# Patient Record
Sex: MALE | Race: WHITE | NOT HISPANIC OR LATINO | URBAN - METROPOLITAN AREA
[De-identification: names, ages, dates, MRNs, and addresses within clinical notes are randomized per-mention and may not be internally consistent; named-entity substitution may affect disease eponyms.]

---

## 2020-07-16 ENCOUNTER — TELEPHONE ENCOUNTER (OUTPATIENT)
Dept: URBAN - METROPOLITAN AREA CLINIC 92 | Facility: CLINIC | Age: 55
End: 2020-07-16

## 2020-07-16 ENCOUNTER — OFFICE VISIT (OUTPATIENT)
Dept: URBAN - METROPOLITAN AREA SURGERY CENTER 23 | Facility: SURGERY CENTER | Age: 55
End: 2020-07-16
Payer: COMMERCIAL

## 2020-07-16 DIAGNOSIS — K22.70 BARRETT ESOPHAGUS: ICD-10-CM

## 2020-07-16 DIAGNOSIS — K29.60 ADENOPAPILLOMATOSIS GASTRICA: ICD-10-CM

## 2020-07-16 DIAGNOSIS — K22.2 ACQUIRED ESOPHAGEAL RING: ICD-10-CM

## 2020-07-16 PROCEDURE — 43239 EGD BIOPSY SINGLE/MULTIPLE: CPT | Performed by: INTERNAL MEDICINE

## 2020-07-16 PROCEDURE — G8907 PT DOC NO EVENTS ON DISCHARG: HCPCS | Performed by: INTERNAL MEDICINE

## 2020-07-16 RX ORDER — PANTOPRAZOLE SODIUM 40 MG/1
1 TABLET TABLET, DELAYED RELEASE ORAL BID
Qty: 60 | Refills: 5 | OUTPATIENT
Start: 2020-07-16

## 2020-07-16 RX ORDER — ASPIRIN 81 MG/1
TABLET, COATED ORAL
Qty: 0 | Refills: 0 | Status: ACTIVE | COMMUNITY
Start: 1900-01-01 | End: 1900-01-01

## 2020-07-16 RX ORDER — OXYCODONE 30 MG/1
TAKE 1 TABLET (30 MG) BY ORAL ROUTE EVERY 6 HOURS TABLET ORAL
Qty: 0 | Refills: 0 | Status: ACTIVE | COMMUNITY
Start: 1900-01-01 | End: 1900-01-01

## 2020-07-30 ENCOUNTER — HOSPITAL ENCOUNTER (EMERGENCY)
Dept: HOSPITAL 5 - ED | Age: 55
Discharge: LEFT BEFORE DISCHARGE | End: 2020-07-30
Payer: MEDICAID

## 2020-07-30 VITALS — SYSTOLIC BLOOD PRESSURE: 131 MMHG | DIASTOLIC BLOOD PRESSURE: 76 MMHG

## 2020-07-30 DIAGNOSIS — M54.6: ICD-10-CM

## 2020-07-30 DIAGNOSIS — Z53.21: ICD-10-CM

## 2020-07-30 DIAGNOSIS — R07.89: Primary | ICD-10-CM

## 2020-07-30 PROCEDURE — 93005 ELECTROCARDIOGRAM TRACING: CPT

## 2022-02-15 ENCOUNTER — OFFICE VISIT (OUTPATIENT)
Dept: URBAN - METROPOLITAN AREA CLINIC 118 | Facility: CLINIC | Age: 57
End: 2022-02-15

## 2022-12-20 ENCOUNTER — OFFICE VISIT (OUTPATIENT)
Dept: URBAN - METROPOLITAN AREA CLINIC 118 | Facility: CLINIC | Age: 57
End: 2022-12-20
Payer: COMMERCIAL

## 2022-12-20 ENCOUNTER — LAB OUTSIDE AN ENCOUNTER (OUTPATIENT)
Dept: URBAN - METROPOLITAN AREA CLINIC 118 | Facility: CLINIC | Age: 57
End: 2022-12-20

## 2022-12-20 VITALS
WEIGHT: 196.6 LBS | HEIGHT: 73 IN | TEMPERATURE: 97 F | SYSTOLIC BLOOD PRESSURE: 149 MMHG | DIASTOLIC BLOOD PRESSURE: 93 MMHG | BODY MASS INDEX: 26.06 KG/M2 | HEART RATE: 101 BPM

## 2022-12-20 DIAGNOSIS — Z12.11 COLON CANCER SCREENING: ICD-10-CM

## 2022-12-20 DIAGNOSIS — K59.03 DRUG-INDUCED CONSTIPATION: ICD-10-CM

## 2022-12-20 DIAGNOSIS — K42.9 UMBILICAL HERNIA WITHOUT OBSTRUCTION AND WITHOUT GANGRENE: ICD-10-CM

## 2022-12-20 DIAGNOSIS — K22.70 BARRETT'S ESOPHAGUS WITHOUT DYSPLASIA: ICD-10-CM

## 2022-12-20 DIAGNOSIS — K21.00 GASTROESOPHAGEAL REFLUX DISEASE WITH ESOPHAGITIS WITHOUT HEMORRHAGE: ICD-10-CM

## 2022-12-20 DIAGNOSIS — Z87.19 HISTORY OF ACUTE PANCREATITIS: ICD-10-CM

## 2022-12-20 DIAGNOSIS — K86.81 EXOCRINE PANCREATIC INSUFFICIENCY: ICD-10-CM

## 2022-12-20 PROCEDURE — 99214 OFFICE O/P EST MOD 30 MIN: CPT | Performed by: INTERNAL MEDICINE

## 2022-12-20 RX ORDER — ASPIRIN 81 MG/1
TABLET, COATED ORAL
Qty: 0 | Refills: 0 | Status: ACTIVE | COMMUNITY
Start: 1900-01-01

## 2022-12-20 RX ORDER — PANTOPRAZOLE SODIUM 40 MG/1
1 TABLET TABLET, DELAYED RELEASE ORAL BID
Qty: 60 | Refills: 5 | Status: DISCONTINUED | COMMUNITY
Start: 2020-07-16

## 2022-12-20 RX ORDER — ESOMEPRAZOLE MAGNESIUM 40 MG/1
1 CAPSULE CAPSULE, DELAYED RELEASE ORAL ONCE A DAY
Status: ACTIVE | COMMUNITY

## 2022-12-20 RX ORDER — OXYCODONE 30 MG/1
TAKE 1 TABLET (30 MG) BY ORAL ROUTE EVERY 6 HOURS TABLET ORAL
Qty: 0 | Refills: 0 | Status: DISCONTINUED | COMMUNITY
Start: 1900-01-01

## 2022-12-20 NOTE — HPI-TODAY'S VISIT:
The patient was last seen in 2020 and is following up for several GI issues.  He has a history of acute alcoholic pancreatitis several years ago.  A CT scan in 2020 showed no evidence of chronic changes.  He has been on Creon therapy for several years and is trying to taper this off.  He has no steatorrhea, nor abnormal loss of weight.  He is down to 1-3 Creon tablets per meal.  He has significant constipation and takes MiraLAX daily he does use narcotics from a pain management center.  He has never tried prescription narcotics.  He is due for screening colonoscopy and has no family history of colon cancer or colon polyps.  His  most significant complaint today is uncontrolled reflux.  He is taking 2 Nexium per day and supplementing with Pepcid over-the-counter.  He has severe substernal burning but no significant dysphagia.  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  He has no dysphagia or odynophagia.  He continues to consume alcohol up to 1/5 of liquor every 2 weeks.  He does have a small umbilical hernia for the last 2 years, that is mildly enlarging in size, but is reducible and not very tender.  He has had no prior imaging or surgical consult.

## 2022-12-20 NOTE — PHYSICAL EXAM GASTROINTESTINAL
Abdomen , soft, nontender, nondistended , no guarding or rigidity , no masses palpable , normal bowel sounds , small umbilical hernia without obstruction , Liver and Spleen , no hepatomegaly present , no hepatosplenomegaly , liver nontender , spleen not palpable

## 2022-12-21 LAB — LIPASE: 20

## 2022-12-31 ENCOUNTER — WEB ENCOUNTER (OUTPATIENT)
Dept: URBAN - METROPOLITAN AREA CLINIC 118 | Facility: CLINIC | Age: 57
End: 2022-12-31

## 2023-01-10 ENCOUNTER — LAB OUTSIDE AN ENCOUNTER (OUTPATIENT)
Dept: URBAN - METROPOLITAN AREA CLINIC 118 | Facility: CLINIC | Age: 58
End: 2023-01-10

## 2023-01-15 LAB
COMMENT: (no result)
HCV RNA, QUANTITATIVE REAL TIME PCR: (no result)
HCV RNA, QUANTITATIVE REAL TIME PCR: 6.58
HEPATITIS B CORE AB TOTAL: (no result)
HEPATITIS B SURFACE ANTIGEN: (no result)
HEPATITIS C ANTIBODY: REACTIVE
INDEX: >11

## 2023-01-18 ENCOUNTER — WEB ENCOUNTER (OUTPATIENT)
Dept: URBAN - METROPOLITAN AREA CLINIC 118 | Facility: CLINIC | Age: 58
End: 2023-01-18

## 2023-01-19 ENCOUNTER — OFFICE VISIT (OUTPATIENT)
Dept: URBAN - METROPOLITAN AREA SURGERY CENTER 23 | Facility: SURGERY CENTER | Age: 58
End: 2023-01-19

## 2023-01-19 ENCOUNTER — OFFICE VISIT (OUTPATIENT)
Dept: URBAN - METROPOLITAN AREA CLINIC 118 | Facility: CLINIC | Age: 58
End: 2023-01-19
Payer: COMMERCIAL

## 2023-01-19 VITALS
SYSTOLIC BLOOD PRESSURE: 118 MMHG | HEART RATE: 112 BPM | BODY MASS INDEX: 24.57 KG/M2 | WEIGHT: 185.4 LBS | HEIGHT: 73 IN | TEMPERATURE: 97.7 F | DIASTOLIC BLOOD PRESSURE: 80 MMHG

## 2023-01-19 DIAGNOSIS — K22.70 BARRETT'S ESOPHAGUS WITHOUT DYSPLASIA: ICD-10-CM

## 2023-01-19 DIAGNOSIS — B18.2 CHRONIC HEPATITIS C WITHOUT HEPATIC COMA: ICD-10-CM

## 2023-01-19 DIAGNOSIS — Z12.11 COLON CANCER SCREENING: ICD-10-CM

## 2023-01-19 DIAGNOSIS — K21.00 GASTROESOPHAGEAL REFLUX DISEASE WITH ESOPHAGITIS WITHOUT HEMORRHAGE: ICD-10-CM

## 2023-01-19 PROCEDURE — 99214 OFFICE O/P EST MOD 30 MIN: CPT | Performed by: INTERNAL MEDICINE

## 2023-01-19 RX ORDER — ASPIRIN 81 MG/1
TABLET, COATED ORAL
Qty: 0 | Refills: 0 | Status: DISCONTINUED | COMMUNITY
Start: 1900-01-01

## 2023-01-19 RX ORDER — ESOMEPRAZOLE MAGNESIUM 40 MG/1
1 CAPSULE CAPSULE, DELAYED RELEASE ORAL ONCE A DAY
Status: ACTIVE | COMMUNITY

## 2023-01-19 RX ORDER — VELPATASVIR AND SOFOSBUVIR 100; 400 MG/1; MG/1
1 TABLET TABLET, FILM COATED ORAL ONCE A DAY
Qty: 84 TABLET | Refills: 0 | OUTPATIENT
Start: 2023-01-19 | End: 2023-04-13

## 2023-01-19 NOTE — HPI-TODAY'S VISIT:
The patient was recently diagnosed with hepatitis C, and a viral load confirms positivity.  He does have a distant history of intravenous drug use several years ago.  He also has had an exposure to hepatitis C through sex several years ago.  He has not used intravenous drugs in the last several years, and is no longer on alcohol.  He denies any right upper quadrant pain, nausea, or vomiting.  He has no history of jaundiced.  There is no family history of cirrhosis or end-stage liver disease.  He has not started any new medications recently.  He has had no prior treatment of the hepatitis.

## 2023-01-20 LAB
ABSOLUTE BASOPHILS: 66
ABSOLUTE EOSINOPHILS: 144
ABSOLUTE LYMPHOCYTES: 1755
ABSOLUTE MONOCYTES: 1074
ABSOLUTE NEUTROPHILS: (no result)
AFP, SERUM, TUMOR MARKER: 2.2
ALBUMIN/GLOBULIN RATIO: 1.4
ALBUMIN: 3.7
ALKALINE PHOSPHATASE: 72
ALT (SGPT): 42
AST (SGOT): 22
BASOPHILS: 0.5
BILIRUBIN, DIRECT: 0.2
BILIRUBIN, INDIRECT: 0.6
BILIRUBIN, TOTAL: 0.8
BUN/CREATININE RATIO: (no result)
CALCIUM: 9.2
CARBON DIOXIDE: 26
CHLORIDE: 99
CREATININE: 0.99
EGFR: 89
EOSINOPHILS: 1.1
GLOBULIN: 2.6
GLUCOSE: 72
HEMATOCRIT: 49.9
HEMOGLOBIN: 16.9
LYMPHOCYTES: 13.4
MCH: 31.2
MCHC: 33.9
MCV: 92.2
MONOCYTES: 8.2
MPV: 10
NEUTROPHILS: 76.8
PLATELET COUNT: 416
POTASSIUM: 4
PROTEIN, TOTAL: 6.3
RDW: 12.2
RED BLOOD CELL COUNT: 5.41
SODIUM: 137
UREA NITROGEN (BUN): 14
WHITE BLOOD CELL COUNT: 13.1

## 2023-01-22 PROBLEM — 266433003: Status: ACTIVE | Noted: 2022-12-20

## 2023-01-22 PROBLEM — 302914006: Status: ACTIVE | Noted: 2022-12-20

## 2023-03-07 ENCOUNTER — WEB ENCOUNTER (OUTPATIENT)
Dept: URBAN - METROPOLITAN AREA CLINIC 118 | Facility: CLINIC | Age: 58
End: 2023-03-07

## 2023-03-08 PROBLEM — 128302006: Status: ACTIVE | Noted: 2023-01-19

## 2023-03-15 LAB
ABSOLUTE BASOPHILS: 29
ABSOLUTE EOSINOPHILS: 252
ABSOLUTE LYMPHOCYTES: 2434
ABSOLUTE MONOCYTES: 511
ABSOLUTE NEUTROPHILS: 3974
ALBUMIN/GLOBULIN RATIO: 1.9
ALBUMIN: 4.4
ALKALINE PHOSPHATASE: 82
ALT (SGPT): 15
AST (SGOT): 18
BASOPHILS: 0.4
BILIRUBIN, DIRECT: 0.1
BILIRUBIN, INDIRECT: 0.4
BILIRUBIN, TOTAL: 0.5
BUN/CREATININE RATIO: (no result)
CALCIUM: 9.4
CARBON DIOXIDE: 28
CHLORIDE: 102
CREATININE: 1.17
EGFR: 73
EOSINOPHILS: 3.5
GLOBULIN: 2.3
GLUCOSE: 74
HCV RNA, QUANTITATIVE: <1.18
HCV RNA, QUANTITATIVE: <15
HEMATOCRIT: 44.3
HEMOGLOBIN: 15.4
LYMPHOCYTES: 33.8
MCH: 30.1
MCHC: 34.8
MCV: 86.5
MONOCYTES: 7.1
MPV: 10.6
NEUTROPHILS: 55.2
PLATELET COUNT: 274
POTASSIUM: 4.5
PROTEIN, TOTAL: 6.7
RDW: 12.8
RED BLOOD CELL COUNT: 5.12
SODIUM: 140
UREA NITROGEN (BUN): 24
WHITE BLOOD CELL COUNT: 7.2

## 2023-03-23 ENCOUNTER — OFFICE VISIT (OUTPATIENT)
Dept: URBAN - METROPOLITAN AREA CLINIC 118 | Facility: CLINIC | Age: 58
End: 2023-03-23
Payer: COMMERCIAL

## 2023-03-23 ENCOUNTER — LAB OUTSIDE AN ENCOUNTER (OUTPATIENT)
Dept: URBAN - METROPOLITAN AREA CLINIC 118 | Facility: CLINIC | Age: 58
End: 2023-03-23

## 2023-03-23 VITALS
DIASTOLIC BLOOD PRESSURE: 95 MMHG | BODY MASS INDEX: 24.78 KG/M2 | SYSTOLIC BLOOD PRESSURE: 159 MMHG | TEMPERATURE: 96.8 F | WEIGHT: 187 LBS | HEIGHT: 73 IN | HEART RATE: 65 BPM

## 2023-03-23 DIAGNOSIS — K22.70 BARRETT'S ESOPHAGUS WITHOUT DYSPLASIA: ICD-10-CM

## 2023-03-23 DIAGNOSIS — B18.2 CHRONIC HEPATITIS C WITHOUT HEPATIC COMA: ICD-10-CM

## 2023-03-23 DIAGNOSIS — K21.00 GASTROESOPHAGEAL REFLUX DISEASE WITH ESOPHAGITIS WITHOUT HEMORRHAGE: ICD-10-CM

## 2023-03-23 DIAGNOSIS — Z12.11 COLON CANCER SCREENING: ICD-10-CM

## 2023-03-23 PROCEDURE — 99214 OFFICE O/P EST MOD 30 MIN: CPT | Performed by: INTERNAL MEDICINE

## 2023-03-23 RX ORDER — ESOMEPRAZOLE MAGNESIUM 40 MG/1
1 CAPSULE CAPSULE, DELAYED RELEASE ORAL ONCE A DAY
Status: DISCONTINUED | COMMUNITY

## 2023-03-23 RX ORDER — ASPIRIN 81 MG/1
1 TABLET TABLET, COATED ORAL ONCE A DAY
Status: ACTIVE | COMMUNITY

## 2023-03-23 RX ORDER — VELPATASVIR AND SOFOSBUVIR 100; 400 MG/1; MG/1
1 TABLET TABLET, FILM COATED ORAL ONCE A DAY
Qty: 84 TABLET | Refills: 0 | Status: ACTIVE | COMMUNITY
Start: 2023-01-19 | End: 2023-04-13

## 2023-03-23 RX ORDER — DILTIAZEM HYDROCHLORIDE 240 MG/1
1 CAPSULE CAPSULE, COATED, EXTENDED RELEASE ORAL ONCE A DAY
Status: ACTIVE | COMMUNITY

## 2023-03-23 RX ORDER — OXYCODONE HYDROCHLORIDE 30 MG/1
AS DIRECTED TABLET ORAL
Refills: 0 | Status: ACTIVE | COMMUNITY

## 2023-03-23 NOTE — HPI-TODAY'S VISIT:
The patient is following up after his recent liver enzymes.  He is compliant with his Epclusa and his laboratory studies now show normal liver enzymes with a negative viral load.  His partner has tested negative for hepatitis C in the past but has not had routine testing recently; the patient was strongly encouraged for the partner to repeat hepatitis C testing before the patient finishes his current bottle.  He did have mild nausea and diarrhea with the treatment but since then has had no symptoms other than a flare of his reflux disease.  He is compliant with no antacids during therapy.  He has no dysphagia but has mild odynophagia.  There is no hematemesis or melena.  His labs showed no evidence of anemia.

## 2023-05-26 ENCOUNTER — OFFICE VISIT (OUTPATIENT)
Dept: URBAN - METROPOLITAN AREA SURGERY CENTER 23 | Facility: SURGERY CENTER | Age: 58
End: 2023-05-26

## 2023-07-19 ENCOUNTER — OFFICE VISIT (OUTPATIENT)
Dept: URBAN - METROPOLITAN AREA SURGERY CENTER 23 | Facility: SURGERY CENTER | Age: 58
End: 2023-07-19

## 2023-09-11 ENCOUNTER — OFFICE VISIT (OUTPATIENT)
Dept: URBAN - METROPOLITAN AREA SURGERY CENTER 23 | Facility: SURGERY CENTER | Age: 58
End: 2023-09-11

## 2024-02-27 ENCOUNTER — LAB (OUTPATIENT)
Dept: URBAN - METROPOLITAN AREA CLINIC 118 | Facility: CLINIC | Age: 59
End: 2024-02-27

## 2024-02-27 ENCOUNTER — OV EP (OUTPATIENT)
Dept: URBAN - METROPOLITAN AREA CLINIC 118 | Facility: CLINIC | Age: 59
End: 2024-02-27

## 2024-02-27 VITALS
SYSTOLIC BLOOD PRESSURE: 126 MMHG | HEIGHT: 72 IN | BODY MASS INDEX: 24.3 KG/M2 | HEART RATE: 66 BPM | WEIGHT: 179.4 LBS | DIASTOLIC BLOOD PRESSURE: 69 MMHG | TEMPERATURE: 97.5 F

## 2024-02-27 RX ORDER — DILTIAZEM HYDROCHLORIDE 240 MG/1
1 CAPSULE CAPSULE, COATED, EXTENDED RELEASE ORAL ONCE A DAY
Status: ACTIVE | COMMUNITY

## 2024-02-27 RX ORDER — ASPIRIN 81 MG/1
1 TABLET TABLET, COATED ORAL ONCE A DAY
Status: ACTIVE | COMMUNITY

## 2024-02-27 RX ORDER — TAMSULOSIN HYDROCHLORIDE 0.4 MG/1
1 CAPSULE CAPSULE ORAL ONCE A DAY
Status: ACTIVE | COMMUNITY

## 2024-02-27 RX ORDER — OXYCODONE HYDROCHLORIDE 30 MG/1
AS DIRECTED TABLET ORAL
Refills: 0 | Status: ON HOLD | COMMUNITY

## 2024-02-27 NOTE — HPI-TODAY'S VISIT:
2/27/24: Pt here for f/u HCV. Has been off epclusa for almost a year. Was planned to have EoT labs rechecked, but has had to reschedule appt multiple times. Referred for __ Denies abd pain, fever/chills, N/V, melena, rectal bleeding, changes in BMs, or unintentional weight loss. Chronic constipation recently stopped pain meds about a month ago, having BM qod. Left lower leg swelling about 4 weeks ago went to ER ([facility]), now resolved.  Admits pruritis, fatigue, trouble sleeping, and poor appetite. Insomnia intermittent for last few months. Denies jaundice, easy bruising/bleeding, and hx anemia. Reports short-term memory loss for past 4 years, but worsening in last 6 months.  Has a shot of liqour QOD. Denies IVDU for past 10 years. States partner got test & was negative.  Rescheduled multiple times.   Taking tums for GERD  An intial colonoscopy screening, and an EGD for surveillance of Barretts, have not been completed.  ------------------------------------ 3/23/23 (Dr. Nugent): The patient is following up after his recent liver enzymes. He is compliant with his Epclusa and his laboratory studies now show normal liver enzymes with a negative viral load. His partner has tested negative for hepatitis C in the past but has not had routine testing recently; the patient was strongly encouraged for the partner to repeat hepatitis C testing before the patient finishes his current bottle. He did have mild nausea and diarrhea with the treatment but since then has had no symptoms other than a flare of his reflux disease. He is compliant with no antacids during therapy. He has no dysphagia but has mild odynophagia. There is no hematemesis or melena. His labs showed no evidence of anemia.

## 2024-03-18 ENCOUNTER — US (OUTPATIENT)
Dept: URBAN - METROPOLITAN AREA CLINIC 117 | Facility: CLINIC | Age: 59
End: 2024-03-18

## 2024-04-22 ENCOUNTER — OV EP (OUTPATIENT)
Dept: URBAN - METROPOLITAN AREA CLINIC 118 | Facility: CLINIC | Age: 59
End: 2024-04-22

## 2024-05-14 ENCOUNTER — TELEPHONE ENCOUNTER (OUTPATIENT)
Dept: URBAN - METROPOLITAN AREA CLINIC 109 | Facility: CLINIC | Age: 59
End: 2024-05-14

## 2024-05-14 ENCOUNTER — WEB ENCOUNTER (OUTPATIENT)
Dept: URBAN - METROPOLITAN AREA CLINIC 118 | Facility: CLINIC | Age: 59
End: 2024-05-14

## 2024-05-21 ENCOUNTER — WEB ENCOUNTER (OUTPATIENT)
Dept: URBAN - METROPOLITAN AREA CLINIC 118 | Facility: CLINIC | Age: 59
End: 2024-05-21

## 2024-06-17 ENCOUNTER — OFFICE VISIT (OUTPATIENT)
Dept: URBAN - METROPOLITAN AREA CLINIC 117 | Facility: CLINIC | Age: 59
End: 2024-06-17
Payer: COMMERCIAL

## 2024-06-17 DIAGNOSIS — Z86.19 HISTORY OF HEPATITIS C: ICD-10-CM

## 2024-06-17 PROCEDURE — 76705 ECHO EXAM OF ABDOMEN: CPT | Performed by: INTERNAL MEDICINE

## 2024-06-24 ENCOUNTER — TELEPHONE ENCOUNTER (OUTPATIENT)
Dept: URBAN - METROPOLITAN AREA CLINIC 118 | Facility: CLINIC | Age: 59
End: 2024-06-24

## 2024-06-24 RX ORDER — VELPATASVIR AND SOFOSBUVIR 100; 400 MG/1; MG/1
1 TABLET TABLET, FILM COATED ORAL ONCE A DAY
Qty: 84 TABLET | Refills: 0 | OUTPATIENT
Start: 2024-06-24 | End: 2024-09-16

## 2024-06-25 ENCOUNTER — TELEPHONE ENCOUNTER (OUTPATIENT)
Dept: URBAN - METROPOLITAN AREA CLINIC 118 | Facility: CLINIC | Age: 59
End: 2024-06-25

## 2024-06-28 ENCOUNTER — TELEPHONE ENCOUNTER (OUTPATIENT)
Dept: URBAN - METROPOLITAN AREA CLINIC 118 | Facility: CLINIC | Age: 59
End: 2024-06-28

## 2024-07-02 ENCOUNTER — DASHBOARD ENCOUNTERS (OUTPATIENT)
Age: 59
End: 2024-07-02

## 2024-07-02 ENCOUNTER — OFFICE VISIT (OUTPATIENT)
Dept: URBAN - METROPOLITAN AREA CLINIC 118 | Facility: CLINIC | Age: 59
End: 2024-07-02
Payer: COMMERCIAL

## 2024-07-02 VITALS
HEIGHT: 72 IN | SYSTOLIC BLOOD PRESSURE: 135 MMHG | HEART RATE: 91 BPM | WEIGHT: 183.6 LBS | BODY MASS INDEX: 24.87 KG/M2 | DIASTOLIC BLOOD PRESSURE: 89 MMHG | TEMPERATURE: 98.9 F

## 2024-07-02 DIAGNOSIS — K22.70 BARRETT'S ESOPHAGUS WITHOUT DYSPLASIA: ICD-10-CM

## 2024-07-02 DIAGNOSIS — K21.00 GASTROESOPHAGEAL REFLUX DISEASE WITH ESOPHAGITIS WITHOUT HEMORRHAGE: ICD-10-CM

## 2024-07-02 DIAGNOSIS — B18.2 CHRONIC HEPATITIS C WITHOUT HEPATIC COMA: ICD-10-CM

## 2024-07-02 PROCEDURE — 99213 OFFICE O/P EST LOW 20 MIN: CPT

## 2024-07-02 RX ORDER — TAMSULOSIN HYDROCHLORIDE 0.4 MG/1
1 CAPSULE CAPSULE ORAL ONCE A DAY
Status: ACTIVE | COMMUNITY

## 2024-07-02 RX ORDER — ASPIRIN 81 MG/1
1 TABLET TABLET, COATED ORAL ONCE A DAY
Status: ACTIVE | COMMUNITY

## 2024-07-02 RX ORDER — VELPATASVIR AND SOFOSBUVIR 100; 400 MG/1; MG/1
1 TABLET TABLET, FILM COATED ORAL ONCE A DAY
Qty: 84 TABLET | Refills: 0 | Status: ON HOLD | COMMUNITY
Start: 2024-06-24 | End: 2024-09-16

## 2024-07-02 RX ORDER — DILTIAZEM HYDROCHLORIDE 240 MG/1
1 CAPSULE CAPSULE, COATED, EXTENDED RELEASE ORAL ONCE A DAY
Status: ACTIVE | COMMUNITY

## 2024-07-02 NOTE — HPI-TODAY'S VISIT:
7/2/24: Pt is a 57 yo male presenting today for f/u appt. Since last visit, informed our office that he stopped Epclusa FDC through treatment in past. Didn't f/u with hepatology. Refer to telephone/web encounters for more details.  US completed 6/17 normal, no signs of cirrhosis. ETOH use 3xs/month with mixed drinks. Last drink over the weekend. Still endorses chronic fatigue, memory problems, and poor appetite. Weight stable, up 4 lbs from last visit. Denies N/V, fever/chills, or GI bleeding. Reflux uncontrolled w/ nexium, skips doses every once in a while. Taking haseeb-seltzer. No dysphagia or odynophagia.   ----------------------------------------- 2/27/24: Pt referred by PCP for mgmt of reactivation of HCV on recent labs from 2/16. Labs showed AST 30, ALT 55, HCV RNA quant 2,820,000, RT-PCR 6.45. Remaining hepatitis panel showed non-reactive hep A and B. He has been off epclusa for almost a year and was planned to have EoT labs rechecked, but has rescheduled appt multiple times. Admits pruritis, fatigue, trouble sleeping, and poor appetite over last few months. Also c/o short-term memory loss for past 4 years, worsening in last 6 months. No jaundice, easy bruising/bleeding, or hx anemia. Had left lower leg swelling on 1/31 resulting in visit to [facility] ER. DVT ruled out and dx'd with synovial cyst of left popliteal space. Labs from ER: AST 1388, . Normal ALP, total bili, PLTs. Admits frequent etoh use, having "a shot of liqour" QOD. Denies high risk activity/known exposure. States partner got tested & was negative for hepatitis. Previous IVDU, none in last 10 years. No abd pain, fever/chills, N/V, melena, rectal bleeding, changes in BMs, or unintentional weight loss. Chronic constipation recently stopped narcotics about a month ago, having BM qod. Chronic gerd taking tums & nexium. An intial colonoscopy screening, and an EGD for surveillance of Barretts, have not been completed.  -------------------------------------------- 3/23/23 (Dr. Nugent): The patient is following up after his recent liver enzymes. He is compliant with his Epclusa and his laboratory studies now show normal liver enzymes with a negative viral load. His partner has tested negative for hepatitis C in the past but has not had routine testing recently; the patient was strongly encouraged for the partner to repeat hepatitis C testing before the patient finishes his current bottle. He did have mild nausea and diarrhea with the treatment but since then has had no symptoms other than a flare of his reflux disease. He is compliant with no antacids during therapy. He has no dysphagia but has mild odynophagia. There is no hematemesis or melena. His labs showed no evidence of anemia.

## 2024-07-09 ENCOUNTER — TELEPHONE ENCOUNTER (OUTPATIENT)
Dept: URBAN - METROPOLITAN AREA CLINIC 118 | Facility: CLINIC | Age: 59
End: 2024-07-09

## 2024-07-10 ENCOUNTER — WEB ENCOUNTER (OUTPATIENT)
Dept: URBAN - METROPOLITAN AREA CLINIC 118 | Facility: CLINIC | Age: 59
End: 2024-07-10

## 2024-07-25 ENCOUNTER — WEB ENCOUNTER (OUTPATIENT)
Dept: URBAN - METROPOLITAN AREA CLINIC 118 | Facility: CLINIC | Age: 59
End: 2024-07-25

## 2024-08-27 ENCOUNTER — OFFICE VISIT (OUTPATIENT)
Dept: URBAN - METROPOLITAN AREA CLINIC 118 | Facility: CLINIC | Age: 59
End: 2024-08-27

## 2024-10-21 ENCOUNTER — WEB ENCOUNTER (OUTPATIENT)
Dept: URBAN - METROPOLITAN AREA CLINIC 118 | Facility: CLINIC | Age: 59
End: 2024-10-21

## 2024-10-23 ENCOUNTER — OFFICE VISIT (OUTPATIENT)
Dept: URBAN - METROPOLITAN AREA CLINIC 118 | Facility: CLINIC | Age: 59
End: 2024-10-23

## 2024-12-03 ENCOUNTER — LAB OUTSIDE AN ENCOUNTER (OUTPATIENT)
Dept: URBAN - METROPOLITAN AREA CLINIC 118 | Facility: CLINIC | Age: 59
End: 2024-12-03

## 2024-12-03 ENCOUNTER — OFFICE VISIT (OUTPATIENT)
Dept: URBAN - METROPOLITAN AREA CLINIC 118 | Facility: CLINIC | Age: 59
End: 2024-12-03
Payer: COMMERCIAL

## 2024-12-03 VITALS
BODY MASS INDEX: 25.57 KG/M2 | WEIGHT: 188.8 LBS | HEART RATE: 84 BPM | DIASTOLIC BLOOD PRESSURE: 73 MMHG | HEIGHT: 72 IN | SYSTOLIC BLOOD PRESSURE: 109 MMHG | TEMPERATURE: 97.7 F

## 2024-12-03 DIAGNOSIS — Z91.199 MEDICAL NON-COMPLIANCE: ICD-10-CM

## 2024-12-03 DIAGNOSIS — B18.2 CHRONIC HEPATITIS C WITHOUT HEPATIC COMA: ICD-10-CM

## 2024-12-03 DIAGNOSIS — K22.70 BARRETT'S ESOPHAGUS WITHOUT DYSPLASIA: ICD-10-CM

## 2024-12-03 DIAGNOSIS — Z12.11 COLON CANCER SCREENING: ICD-10-CM

## 2024-12-03 PROCEDURE — 99214 OFFICE O/P EST MOD 30 MIN: CPT | Performed by: INTERNAL MEDICINE

## 2024-12-03 RX ORDER — ESOMEPRAZOLE MAGNESIUM 40 MG/1
1 CAPSULE 1/2 TO 1 HOUR BEFORE MORNING MEAL CAPSULE, DELAYED RELEASE ORAL ONCE A DAY
Status: ACTIVE | COMMUNITY

## 2024-12-03 RX ORDER — TAMSULOSIN HYDROCHLORIDE 0.4 MG/1
1 CAPSULE CAPSULE ORAL ONCE A DAY
Status: ACTIVE | COMMUNITY

## 2024-12-03 RX ORDER — SOFOSBUVIR, VELPATASVIR, AND VOXILAPREVIR 400; 100; 100 MG/1; MG/1; MG/1
TAKE 1 TABLET BY MOUTH ONCE A DAY WITH FOOD FOR 12 WEEKS TABLET, FILM COATED ORAL
Qty: 28 EACH | Refills: 2 | Status: ACTIVE | COMMUNITY

## 2024-12-03 RX ORDER — DILTIAZEM HYDROCHLORIDE 240 MG/1
1 CAPSULE CAPSULE, COATED, EXTENDED RELEASE ORAL ONCE A DAY
Status: ACTIVE | COMMUNITY

## 2024-12-03 RX ORDER — ASPIRIN 81 MG/1
1 TABLET TABLET, COATED ORAL ONCE A DAY
Status: ACTIVE | COMMUNITY

## 2024-12-03 RX ORDER — METHADONE HYDROCHLORIDE 10 MG/1
1 TABLET TABLET ORAL ONCE A DAY
Status: ACTIVE | COMMUNITY
Start: 2024-12-04

## 2024-12-03 NOTE — HPI-TODAY'S VISIT:
The patient is following up after retreatment of his hepatitis C.  He finished antiviral therapy about 1 month ago.  He had previously failed Epclusa with medication noncompliance.  He has no nausea, vomiting, abdominal pain, jaundice, or itching.  He is due for end of treatment laboratories.

## 2024-12-05 LAB
AFP, SERUM, TUMOR MARKER: 3.1
ALBUMIN/GLOBULIN RATIO: 1.7
ALBUMIN: 4.5
ALKALINE PHOSPHATASE: 124
ALT (SGPT): 12
AST (SGOT): 16
BILIRUBIN, DIRECT: 0.1
BILIRUBIN, INDIRECT: 0.3
BILIRUBIN, TOTAL: 0.4
GLOBULIN: 2.6
HCV RNA, QUANTITATIVE: <1.18
HCV RNA, QUANTITATIVE: <15
PROTEIN, TOTAL: 7.1

## 2025-01-02 ENCOUNTER — WEB ENCOUNTER (OUTPATIENT)
Dept: URBAN - METROPOLITAN AREA CLINIC 118 | Facility: CLINIC | Age: 60
End: 2025-01-02